# Patient Record
(demographics unavailable — no encounter records)

---

## 2024-10-16 NOTE — PHYSICAL EXAM
[Cane] : ambulates with cane [Lopez's Sign] : negative Lopez's sign [Pronator Drift] : negative pronator drift [SLR] : negative straight leg raise [de-identified] : 5 out of 5 motor strength, sensation is intact and symmetrical full range of motion flexion extension and rotation, no palpatory tenderness full range of motion of hips knees shoulders and elbows (all four extremities), no atrophy, negative straight leg raise, no pathological reflexes, no swelling, normal ambulation, no apparent distress skin is intact, no palpable lymph nodes, no upper or lower extremity instability, alert and oriented x3 and normal mood. Normal finger-to nose test.  Incision healing. Pain over right hamstring.  Left SI joint pain. [de-identified] : XR AP Lat Lumbar 10/16/2024 -L3-4 laminectomy and L4-5 fusion, adequate- reviewed with patient.    XR AP Lat Lumbar 01/17/2024-L4-5 fusion-reviewed with patient.    XR AP Lat Lumbar 09/18/2023 -s/p L3-4 laminectomy and L4-5 fusion, adequate- reviewed with patient.    XR AP Lat Lumbar 8/16/23 -L3-4 laminectomy and L4-5 fusion, adequate- reviewed with patient.

## 2024-10-16 NOTE — ADDENDUM
[FreeTextEntry1] : This note was written by Max Bonilla on 10/16/2024 acting as scribe for Dr. Valentino Hall M.D.  I, Valentino Hall MD, have read and attest that all the information, medical decision making and discharge instructions within are true and accurate.

## 2024-10-16 NOTE — HISTORY OF PRESENT ILLNESS
[Improving] : improving [de-identified] : 59 year male presents s/p L3-4 laminectomy and L4-5 fusion on 8/1/23.  Last seen in January for right GT bursitis.  He presents today for lower back pain for the past few weeks, with radiation to the right lateral thigh. He also has numbness/tingling of the toes.  Sitting aggravates the pain.  Takes tylenol for the pain. Avoids NSAIDs due to CKD 3.  Has been participating with PT 3x/week for the past 4 weeks which helps. He received an injection to the right lateral thigh a few months ago with pain management which helped.   PMHx: CKD, DM, HTN   No fever chills sweats nausea vomiting no bowel or bladder dysfunction, no recent weight loss or gain no night pain. This history is in addition to the intake form that I personally reviewed.

## 2024-10-16 NOTE — DISCUSSION/SUMMARY
[de-identified] : s/p L3-4 laminectomy and L4-5 fusion on 8/1/23.  Left trochanteric bursitis. Left hamstring pain. Discussed all options.  Discussed exercise do's and don'ts. I offered an injection after all risks were explained including allergic reaction to an infection under sterile conditions 1 mg of Depo-Medrol and 2 cc of 1% Lidocaine without epinephrine was injected into the painful site. The patient tolerated the procedure well and received significant relief following the injection. (left SI) All options discussed including rest, medicine, home exercise, acupuncture, Chiropractic care, Physical Therapy, Pain management, and last resort surgery. All questions were answered, all alternatives discussed, and the patient is in complete agreement with the treatment plan which the patient contributed to and discussed with me through the shared decision-making process. Follow-up appointment as instructed. Any issues and the patient will call or come in sooner.
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
## 2025-02-20 NOTE — DISCUSSION/SUMMARY
[de-identified] : s/p L3-4 laminectomy and L4-5 fusion on 8/1/23.  Right sacroiliitis. Discussed all options.  Discussed exercise do's and don'ts. I offered an injection after all risks were explained including allergic reaction to an infection under sterile conditions 1 mg of Depo-Medrol and 2 cc of 1% Lidocaine without epinephrine was injected into the painful site. The patient tolerated the procedure well and received significant relief following the injection. (right SI) All options discussed including rest, medicine, home exercise, acupuncture, Chiropractic care, Physical Therapy, Pain management, and last resort surgery. All questions were answered, all alternatives discussed, and the patient is in complete agreement with the treatment plan which the patient contributed to and discussed with me through the shared decision-making process. Follow-up appointment as instructed. Any issues and the patient will call or come in sooner.

## 2025-02-20 NOTE — ADDENDUM
[FreeTextEntry1] : This note was written by Max Bonilla on 02/20/2025 acting as scribe for Dr. Valentino Hall M.D.  I, Valentino Hall MD, have read and attest that all the information, medical decision making and discharge instructions within are true and accurate.

## 2025-02-20 NOTE — HISTORY OF PRESENT ILLNESS
[Improving] : improving [de-identified] : 59 year male presents s/p L3-4 laminectomy and L4-5 fusion on 8/1/23.  Last seen in October 2024 and was given a left SI Joint injection, which helped. He started developing right sided lower back pain with radiation down the RLE posteriorly to the foot, with tingling. Denies weakness. Sitting and laying on his right side aggravates the pain.  Takes tylenol for the pain. Avoids NSAIDs due to CKD 3.  Has last tried PT in October 2024, had 15 sessions which helped at the time. He received a lumbar injection (can not recall the type) about 2 months ago with Dr. Pérez which helped for some time.  Has MRI Lumbar and presents today for review.  PMHx: CKD, DM, HTN   No fever chills sweats nausea vomiting no bowel or bladder dysfunction, no recent weight loss or gain no night pain. This history is in addition to the intake form that I personally reviewed.